# Patient Record
Sex: MALE | Race: WHITE
[De-identification: names, ages, dates, MRNs, and addresses within clinical notes are randomized per-mention and may not be internally consistent; named-entity substitution may affect disease eponyms.]

---

## 2022-01-23 ENCOUNTER — HOSPITAL ENCOUNTER (INPATIENT)
Dept: HOSPITAL 54 - ER | Age: 67
LOS: 3 days | Discharge: HOME | DRG: 194 | End: 2022-01-26
Attending: NURSE PRACTITIONER | Admitting: INTERNAL MEDICINE
Payer: COMMERCIAL

## 2022-01-23 VITALS — HEIGHT: 68 IN | BODY MASS INDEX: 31.22 KG/M2 | WEIGHT: 206 LBS

## 2022-01-23 DIAGNOSIS — E87.2: ICD-10-CM

## 2022-01-23 DIAGNOSIS — M48.04: ICD-10-CM

## 2022-01-23 DIAGNOSIS — I11.0: Primary | ICD-10-CM

## 2022-01-23 DIAGNOSIS — Z20.822: ICD-10-CM

## 2022-01-23 DIAGNOSIS — M25.78: ICD-10-CM

## 2022-01-23 DIAGNOSIS — I42.0: ICD-10-CM

## 2022-01-23 DIAGNOSIS — N62: ICD-10-CM

## 2022-01-23 DIAGNOSIS — I25.2: ICD-10-CM

## 2022-01-23 DIAGNOSIS — K86.1: ICD-10-CM

## 2022-01-23 DIAGNOSIS — K76.0: ICD-10-CM

## 2022-01-23 DIAGNOSIS — I21.A1: ICD-10-CM

## 2022-01-23 DIAGNOSIS — F41.9: ICD-10-CM

## 2022-01-23 DIAGNOSIS — I16.0: ICD-10-CM

## 2022-01-23 DIAGNOSIS — I50.31: ICD-10-CM

## 2022-01-23 DIAGNOSIS — F32.A: ICD-10-CM

## 2022-01-23 DIAGNOSIS — D69.6: ICD-10-CM

## 2022-01-23 DIAGNOSIS — Z79.82: ICD-10-CM

## 2022-01-23 DIAGNOSIS — R74.01: ICD-10-CM

## 2022-01-23 DIAGNOSIS — Z79.899: ICD-10-CM

## 2022-01-23 DIAGNOSIS — I25.10: ICD-10-CM

## 2022-01-23 LAB
ALBUMIN SERPL BCP-MCNC: 4 G/DL (ref 3.4–5)
ALP SERPL-CCNC: 51 U/L (ref 46–116)
ALT SERPL W P-5'-P-CCNC: 82 U/L (ref 12–78)
AST SERPL W P-5'-P-CCNC: 88 U/L (ref 15–37)
BASOPHILS # BLD AUTO: 0 K/UL (ref 0–0.2)
BASOPHILS NFR BLD AUTO: 0.3 % (ref 0–2)
BILIRUB DIRECT SERPL-MCNC: 0.2 MG/DL (ref 0–0.2)
BILIRUB SERPL-MCNC: 0.6 MG/DL (ref 0.2–1)
BILIRUB UR QL STRIP: NEGATIVE
BUN SERPL-MCNC: 12 MG/DL (ref 7–18)
CALCIUM SERPL-MCNC: 9.9 MG/DL (ref 8.5–10.1)
CHLORIDE SERPL-SCNC: 99 MMOL/L (ref 98–107)
CO2 SERPL-SCNC: 18 MMOL/L (ref 21–32)
COLOR UR: YELLOW
CREAT SERPL-MCNC: 1.2 MG/DL (ref 0.6–1.3)
DEPRECATED SQUAMOUS URNS QL MICRO: (no result) /HPF
EOSINOPHIL NFR BLD AUTO: 0.1 % (ref 0–6)
GLUCOSE SERPL-MCNC: 195 MG/DL (ref 74–106)
GLUCOSE UR STRIP-MCNC: NEGATIVE MG/DL
HCT VFR BLD AUTO: 52 % (ref 39–51)
HGB BLD-MCNC: 17.1 G/DL (ref 13.5–17.5)
LEUKOCYTE ESTERASE UR QL STRIP: NEGATIVE
LIPASE SERPL-CCNC: 246 U/L (ref 73–393)
LYMPHOCYTES NFR BLD AUTO: 0.7 K/UL (ref 0.8–4.8)
LYMPHOCYTES NFR BLD AUTO: 6.2 % (ref 20–44)
MAGNESIUM SERPL-MCNC: 1.9 MG/DL (ref 1.8–2.4)
MCHC RBC AUTO-ENTMCNC: 33 G/DL (ref 31–36)
MCV RBC AUTO: 86 FL (ref 80–96)
MONOCYTES NFR BLD AUTO: 0.4 K/UL (ref 0.1–1.3)
MONOCYTES NFR BLD AUTO: 3.4 % (ref 2–12)
NEUTROPHILS # BLD AUTO: 9.5 K/UL (ref 1.8–8.9)
NEUTROPHILS NFR BLD AUTO: 90 % (ref 43–81)
NITRITE UR QL STRIP: NEGATIVE
PH UR STRIP: 5.5 [PH] (ref 5–8)
PLATELET # BLD AUTO: 146 K/UL (ref 150–450)
POTASSIUM SERPL-SCNC: 3.5 MMOL/L (ref 3.5–5.1)
PROT SERPL-MCNC: 8.7 G/DL (ref 6.4–8.2)
PROT UR QL STRIP: >=300 MG/DL
RBC # BLD AUTO: 6.08 MIL/UL (ref 4.5–6)
RBC #/AREA URNS HPF: (no result) /HPF (ref 0–2)
SODIUM SERPL-SCNC: 140 MMOL/L (ref 136–145)
TSH SERPL DL<=0.005 MIU/L-ACNC: 1.82 UIU/ML (ref 0.36–3.74)
UROBILINOGEN UR STRIP-MCNC: 0.2 EU/DL
WBC #/AREA URNS HPF: (no result) /HPF (ref 0–3)
WBC NRBC COR # BLD AUTO: 10.6 K/UL (ref 4.3–11)

## 2022-01-23 PROCEDURE — G0378 HOSPITAL OBSERVATION PER HR: HCPCS

## 2022-01-23 PROCEDURE — C9803 HOPD COVID-19 SPEC COLLECT: HCPCS

## 2022-01-23 PROCEDURE — U0003 INFECTIOUS AGENT DETECTION BY NUCLEIC ACID (DNA OR RNA); SEVERE ACUTE RESPIRATORY SYNDROME CORONAVIRUS 2 (SARS-COV-2) (CORONAVIRUS DISEASE [COVID-19]), AMPLIFIED PROBE TECHNIQUE, MAKING USE OF HIGH THROUGHPUT TECHNOLOGIES AS DESCRIBED BY CMS-2020-01-R: HCPCS

## 2022-01-23 RX ADMIN — ENOXAPARIN SODIUM SCH MG: 40 INJECTION SUBCUTANEOUS at 23:30

## 2022-01-23 NOTE — NUR
TO ER BED 8, NIKKIA RA39 "Chills/NOT feeling well. Palpitation, Nausea/NO 
appetite". "I thought i'm hypoglycemic". Blood sugar 209, aaox3, breathing even 
and non labored, denies pain and sob, connected to monitor, awaiting md orders

## 2022-01-24 LAB
ALBUMIN SERPL BCP-MCNC: 3.3 G/DL (ref 3.4–5)
ALP SERPL-CCNC: 43 U/L (ref 46–116)
ALT SERPL W P-5'-P-CCNC: 59 U/L (ref 12–78)
AST SERPL W P-5'-P-CCNC: 70 U/L (ref 15–37)
BASOPHILS # BLD AUTO: 0 K/UL (ref 0–0.2)
BASOPHILS NFR BLD AUTO: 0.3 % (ref 0–2)
BILIRUB DIRECT SERPL-MCNC: 0.3 MG/DL (ref 0–0.2)
BILIRUB SERPL-MCNC: 1.2 MG/DL (ref 0.2–1)
BUN SERPL-MCNC: 9 MG/DL (ref 7–18)
CALCIUM SERPL-MCNC: 8.4 MG/DL (ref 8.5–10.1)
CHLORIDE SERPL-SCNC: 100 MMOL/L (ref 98–107)
CO2 SERPL-SCNC: 28 MMOL/L (ref 21–32)
CREAT SERPL-MCNC: 0.8 MG/DL (ref 0.6–1.3)
EOSINOPHIL NFR BLD AUTO: 0.8 % (ref 0–6)
GLUCOSE SERPL-MCNC: 102 MG/DL (ref 74–106)
HCT VFR BLD AUTO: 45 % (ref 39–51)
HGB BLD-MCNC: 15.2 G/DL (ref 13.5–17.5)
LYMPHOCYTES NFR BLD AUTO: 0.6 K/UL (ref 0.8–4.8)
LYMPHOCYTES NFR BLD AUTO: 12.1 % (ref 20–44)
MAGNESIUM SERPL-MCNC: 1.8 MG/DL (ref 1.8–2.4)
MCHC RBC AUTO-ENTMCNC: 34 G/DL (ref 31–36)
MCV RBC AUTO: 86 FL (ref 80–96)
MONOCYTES NFR BLD AUTO: 0.6 K/UL (ref 0.1–1.3)
MONOCYTES NFR BLD AUTO: 10.9 % (ref 2–12)
NEUTROPHILS # BLD AUTO: 3.9 K/UL (ref 1.8–8.9)
NEUTROPHILS NFR BLD AUTO: 75.9 % (ref 43–81)
PHOSPHATE SERPL-MCNC: 2.6 MG/DL (ref 2.5–4.9)
PLATELET # BLD AUTO: 99 K/UL (ref 150–450)
POTASSIUM SERPL-SCNC: 3.4 MMOL/L (ref 3.5–5.1)
PROT SERPL-MCNC: 7.2 G/DL (ref 6.4–8.2)
RBC # BLD AUTO: 5.27 MIL/UL (ref 4.5–6)
SODIUM SERPL-SCNC: 137 MMOL/L (ref 136–145)
TSH SERPL DL<=0.005 MIU/L-ACNC: 1.16 UIU/ML (ref 0.36–3.74)
WBC NRBC COR # BLD AUTO: 5.1 K/UL (ref 4.3–11)

## 2022-01-24 RX ADMIN — CEFEPIME HYDROCHLORIDE SCH MLS/HR: 1 INJECTION, POWDER, FOR SOLUTION INTRAMUSCULAR; INTRAVENOUS at 22:00

## 2022-01-24 RX ADMIN — SODIUM CHLORIDE PRN MLS/HR: 4.5 INJECTION, SOLUTION INTRAVENOUS at 03:55

## 2022-01-24 RX ADMIN — Medication SCH MG: at 08:09

## 2022-01-24 RX ADMIN — Medication SCH MG: at 08:10

## 2022-01-24 RX ADMIN — DEXTROSE MONOHYDRATE SCH MLS/HR: 50 INJECTION, SOLUTION INTRAVENOUS at 20:54

## 2022-01-24 RX ADMIN — CARVEDILOL SCH MG: 12.5 TABLET, FILM COATED ORAL at 16:34

## 2022-01-24 RX ADMIN — CARVEDILOL SCH MG: 12.5 TABLET, FILM COATED ORAL at 08:10

## 2022-01-24 RX ADMIN — PANTOPRAZOLE SODIUM SCH MG: 40 TABLET, DELAYED RELEASE ORAL at 08:09

## 2022-01-24 RX ADMIN — DEXTROSE MONOHYDRATE PRN MG: 50 INJECTION, SOLUTION INTRAVENOUS at 11:47

## 2022-01-24 RX ADMIN — CEFEPIME HYDROCHLORIDE SCH MLS/HR: 1 INJECTION, POWDER, FOR SOLUTION INTRAMUSCULAR; INTRAVENOUS at 10:11

## 2022-01-24 RX ADMIN — DEXTROSE MONOHYDRATE PRN MG: 50 INJECTION, SOLUTION INTRAVENOUS at 04:51

## 2022-01-24 RX ADMIN — DEXTROSE MONOHYDRATE SCH MLS/HR: 50 INJECTION, SOLUTION INTRAVENOUS at 08:47

## 2022-01-24 RX ADMIN — LISINOPRIL SCH MG: 5 TABLET ORAL at 08:11

## 2022-01-24 RX ADMIN — ENOXAPARIN SODIUM SCH MG: 40 INJECTION SUBCUTANEOUS at 21:17

## 2022-01-24 NOTE — NUR
PT TRANSPORTED TO UNIT ON [facility] WITH EMT AND RN AT BEDSIDE W/ ACLS PROTOCOL. 
NAD NOTED DURING TRANSPORT. PT AMBULATED FROM RNEY TO BED WITH MIN ASSIST.

## 2022-01-24 NOTE — NUR
2230 NOTED PATIENT'S IV DISLODGED.  HE SAID IT GOT PULLED OUT WHEN HE TRIED TO REACH FOR HIS 
CONTROL.  INSERTED NEW IV OF LEFT HAND, ATTEMTPED ONCE WITH GOOD BLOOD RETURN NOTED. IVF 
CONNECTED TO NEW IV ACCESS.

## 2022-01-24 NOTE — NUR
2115 DR LEBLANC WAS NOTIFIED OF PATIENT'S COMPLAIN OF ABDOMINAL PAIN 60/10 RATE WITH ORDER 
MADE.  ORDER NOTED AND CARRIED OUT.

## 2022-01-24 NOTE — NUR
RN NOTE

RECEIVED PT FROM ER VIA [facility] TO RM.110 ACCOMPANIED BY STAFF/RN. PT AWAKE, A/OX4. PT ABLE 
TO AMBULATE FROM GURNEY TO BED, PT FEELING "SHAKY", STANDBY ASSIST PROVIDED. RESPIRATIONS 
EVEN/UNLABORED, ON O2 @2LPM VIA NC. O2 SAT 97%. CONNECTED TO TELE MONITOR. IV SITE: [facility] 
#20G INTACT/PATENT/FLUSHES WELL. PT DENIES ANY PAIN AND DENIES NAUSEA AT THIS TIME. PT IN NO 
ACUTE DISTRESS. SAFETY MEASURES IN PLACE, BED IN LOWEST LOCKED POSITION, S/R UPX2, CALL 
LIGHT WITHIN REACH. WILL CONT TO MONITOR.

-------------------------------------------------------------------------------

Addendum: 01/24/22 at 0346 by AROLDO PATTON RN

-------------------------------------------------------------------------------

LOVENOX ALREADY GIVEN IN THE E.R., PER ER RN REPORT

## 2022-01-25 VITALS — DIASTOLIC BLOOD PRESSURE: 59 MMHG | SYSTOLIC BLOOD PRESSURE: 110 MMHG

## 2022-01-25 RX ADMIN — CARVEDILOL SCH MG: 12.5 TABLET, FILM COATED ORAL at 08:40

## 2022-01-25 RX ADMIN — CEFEPIME HYDROCHLORIDE SCH MLS/HR: 1 INJECTION, POWDER, FOR SOLUTION INTRAMUSCULAR; INTRAVENOUS at 09:54

## 2022-01-25 RX ADMIN — SODIUM CHLORIDE PRN MLS/HR: 4.5 INJECTION, SOLUTION INTRAVENOUS at 03:02

## 2022-01-25 RX ADMIN — CEFEPIME HYDROCHLORIDE SCH MLS/HR: 1 INJECTION, POWDER, FOR SOLUTION INTRAMUSCULAR; INTRAVENOUS at 22:18

## 2022-01-25 RX ADMIN — CARVEDILOL SCH MG: 12.5 TABLET, FILM COATED ORAL at 17:57

## 2022-01-25 RX ADMIN — ENOXAPARIN SODIUM SCH MG: 40 INJECTION SUBCUTANEOUS at 21:02

## 2022-01-25 RX ADMIN — Medication SCH MG: at 08:40

## 2022-01-25 RX ADMIN — DEXTROSE MONOHYDRATE SCH MLS/HR: 50 INJECTION, SOLUTION INTRAVENOUS at 21:02

## 2022-01-25 RX ADMIN — Medication PRN MG: at 12:10

## 2022-01-25 RX ADMIN — DEXTROSE MONOHYDRATE SCH MLS/HR: 50 INJECTION, SOLUTION INTRAVENOUS at 08:41

## 2022-01-25 RX ADMIN — Medication PRN MG: at 12:15

## 2022-01-25 RX ADMIN — LISINOPRIL SCH MG: 5 TABLET ORAL at 08:40

## 2022-01-25 RX ADMIN — PANTOPRAZOLE SODIUM SCH MG: 40 TABLET, DELAYED RELEASE ORAL at 08:39

## 2022-01-25 RX ADMIN — DEXTROSE MONOHYDRATE PRN MG: 50 INJECTION, SOLUTION INTRAVENOUS at 04:41

## 2022-01-25 NOTE — NUR
0630 NO FURTHER NAUSEA VOMITING NOTED.  NO COMPLAIN OF PAIN WHEN ASKED.  IVF INFUSING TO 
LEFT HAND. NO SIGNS OF INFILTRATION NOTED.  CALL LIGHT PLACED WITHIN REACH.  WILL ENDORSE TO 
AM NURSER FOR TRANSFER OF CARE.

## 2022-01-25 NOTE — NUR
1910 REPORT RE[facility]ED [facility]

## 2022-01-25 NOTE — NUR
RN NOTES



PATIENT BACK IN UNIT. PATIENT IN STABLE CONDITION. BREATHING EVEN AND UNLABORED. NO SOB OR 
ANY RESPIRATORY DISTRESS NOTED. ALL NEEDS ATTENDED. KEPT DRY AND CLEAN. ALL SAFETY 
PRECAUTIONS IN PLACE. BED LOCKED AND IN LOWEST POSITION. CALL LIGHT WITHIN REACH. WILL 
CONTINUE TO MONITOR.

## 2022-01-25 NOTE — NUR
RN CLOSING NOTES



PATIENT REMAINS IN STABLE CONDITION THROUGHOUT SHIFT. BREATHING EVEN AND UNLABORED IN ROOM 
AIR, TOLERATING WELL SAT 96%. IV ACCESS ON LEFT HAND #22 AND RIGHT AC #18, PATENT AND INTACT 
INFUSING 1/2 NS @ 75 ML/HR, NO SIGNS OF INFILTRATIONS. ALL DUE MEDS GIVEN AS ORDERED. KEPT 
PATIENT CLEAN, DRY AND COMFORTABLE. ALL NEEDS ATTENDED. ALL APPLICABLE ISOLATION PRECAUTIONS 
IN PLACE. ALL SAFETY MEASURES IN PLACE: HOB ELEVATED, BED LOCKED AND IN LOWEST POSITION WITH 
SIDERAILS UP. CALL LIGHT WITHIN REACH. WILL ENDORSE TO ONCOMING NURSE FOR CONTINUITY OF 
CARE.

## 2022-01-25 NOTE — NUR
0445 MEDICATED FOR COMPLAIN OF NAUSEA.  NO VOMITING NOTED.  PATIENT REMAINS AWAKE IN BED. NO 
SIGNS OF DISTRESS NOTED.  NO C/O PAIN WHEN ASKED. IVF INFUSING TO LEFT HAND, NO SIGNS OF 
INFILTRATION NOTED.  CONT. TO TOLERATE ROOM AIR.  ALL NEEDS ATTENDED. CALL LIGHT PLACED 
WITHIN REACH.

## 2022-01-25 NOTE — NUR
7155 ASSISTED TO BATHROOM. STILL SHAKY WHEN WALKING.  FALL PRECAUTIONS OBSERVED.  CONT. TO 
TOLERATE ROOM AIR.  NO C/O PAIN WHEN ASKED.  NO COMPLAIN OF SOB WHEN ASKED.  NEEDS ATTENDED. 
 CALL LIGHT WITHIN REACH.

## 2022-01-25 NOTE — NUR
0200 NO ACUTE CHANGES.  CONT. TO DENY CHEST PAIN.  NO DISTRESS NOTED. NEEDS ANTICIPATED.  
WILL CONT. MONITOR.

## 2022-01-25 NOTE — NUR
RN OPENING NOTES



RECEIVED PATIENT AWAKE, ALERT/ORIENTED X 4. ON ROOM AIR, TOLERATING WELL. BREATHING EVEN AND 
UNLABORED. NO SOB OR ANY SIGNS OF RESPIRATORY DISTRESS NOTED.  IV ACCESS ON LEFT HAND PATENT 
AND INTACT, INFUSING 1/2 NS AT 75 CC/HR. NO SIGNS OF INFILTRATIONS. ALL APPLICABLE 
ISOLATIONS PRECAUTIONS IN PLACE. ALL SAFETY MEASURES IN PLACE. BED LOCKED AND IN LOWEST 
POSITION WITH SIDERAILS UP. CALL LIGHT PLACED WITHIN REACH.  WILL CONTINUE TO MONITOR 
PATIENT ACCORDINGLY.

## 2022-01-25 NOTE — NUR
2000 RECEIVED PATIENT SITTING ON EDGE OF BED.  NO COMPLAIN OF PAIN OR ANY DISCOMFORT WHEN 
ASKED,  NO C/O SHORTNESS OF BREATH OF DIFFICULTY BREATHING WHEN ASKED.  ON ROOM AIR.  IVF 
INFUSING TO RIGHT AC. NO SIGNS OF INFILTRATION NOTED.  IVF STOPPED PER ORDER.  ASSISTED 
PATIENT TO BATHROOM.  STILL A LITTLE SHAKY WHEN WALKING.  FALL PRECAUTIONS OBSERVED.  
INDEPENDENT WITH TURNING.  CALL LIGHT PLACED WITH REACH AND REMINDED TO CALL FOR ASSISTANCE.

## 2022-01-25 NOTE — NUR
RN NOTES



PATIENT LEFT UNIT FOR PROCEDURE: CT ANGIOGRAM OF HEART WITH 3D IMAGE. PATIENT IN STABLE 
CONDITION AT THE TIME.

## 2022-01-26 VITALS — SYSTOLIC BLOOD PRESSURE: 124 MMHG | DIASTOLIC BLOOD PRESSURE: 80 MMHG

## 2022-01-26 VITALS — DIASTOLIC BLOOD PRESSURE: 80 MMHG | SYSTOLIC BLOOD PRESSURE: 120 MMHG

## 2022-01-26 VITALS — SYSTOLIC BLOOD PRESSURE: 125 MMHG | DIASTOLIC BLOOD PRESSURE: 89 MMHG

## 2022-01-26 VITALS — SYSTOLIC BLOOD PRESSURE: 140 MMHG | DIASTOLIC BLOOD PRESSURE: 85 MMHG

## 2022-01-26 VITALS — DIASTOLIC BLOOD PRESSURE: 80 MMHG | SYSTOLIC BLOOD PRESSURE: 124 MMHG

## 2022-01-26 VITALS — SYSTOLIC BLOOD PRESSURE: 112 MMHG | DIASTOLIC BLOOD PRESSURE: 61 MMHG

## 2022-01-26 LAB
BASOPHILS # BLD AUTO: 0 K/UL (ref 0–0.2)
BASOPHILS NFR BLD AUTO: 0.6 % (ref 0–2)
BUN SERPL-MCNC: 16 MG/DL (ref 7–18)
CALCIUM SERPL-MCNC: 9.1 MG/DL (ref 8.5–10.1)
CHLORIDE SERPL-SCNC: 102 MMOL/L (ref 98–107)
CO2 SERPL-SCNC: 28 MMOL/L (ref 21–32)
CREAT SERPL-MCNC: 1 MG/DL (ref 0.6–1.3)
EOSINOPHIL NFR BLD AUTO: 2.7 % (ref 0–6)
EOSINOPHIL NFR BLD MANUAL: 3 % (ref 0–4)
GLUCOSE SERPL-MCNC: 95 MG/DL (ref 74–106)
HCT VFR BLD AUTO: 47 % (ref 39–51)
HGB BLD-MCNC: 15.5 G/DL (ref 13.5–17.5)
LYMPHOCYTES NFR BLD AUTO: 1 K/UL (ref 0.8–4.8)
LYMPHOCYTES NFR BLD AUTO: 22.4 % (ref 20–44)
LYMPHOCYTES NFR BLD MANUAL: 26 % (ref 16–48)
MAGNESIUM SERPL-MCNC: 2.3 MG/DL (ref 1.8–2.4)
MCHC RBC AUTO-ENTMCNC: 33 G/DL (ref 31–36)
MCV RBC AUTO: 87 FL (ref 80–96)
MONOCYTES NFR BLD AUTO: 0.7 K/UL (ref 0.1–1.3)
MONOCYTES NFR BLD AUTO: 15.5 % (ref 2–12)
MONOCYTES NFR BLD MANUAL: 17 % (ref 0–11)
NEUTROPHILS # BLD AUTO: 2.6 K/UL (ref 1.8–8.9)
NEUTROPHILS NFR BLD AUTO: 58.8 % (ref 43–81)
NEUTS SEG NFR BLD MANUAL: 53 % (ref 42–76)
PHOSPHATE SERPL-MCNC: 4.8 MG/DL (ref 2.5–4.9)
PLATELET # BLD AUTO: 88 K/UL (ref 150–450)
POTASSIUM SERPL-SCNC: 4.2 MMOL/L (ref 3.5–5.1)
RBC # BLD AUTO: 5.41 MIL/UL (ref 4.5–6)
SODIUM SERPL-SCNC: 139 MMOL/L (ref 136–145)
WBC NRBC COR # BLD AUTO: 4.5 K/UL (ref 4.3–11)

## 2022-01-26 RX ADMIN — PANTOPRAZOLE SODIUM SCH MG: 40 TABLET, DELAYED RELEASE ORAL at 08:25

## 2022-01-26 RX ADMIN — Medication SCH MG: at 08:26

## 2022-01-26 RX ADMIN — DEXTROSE MONOHYDRATE SCH MLS/HR: 50 INJECTION, SOLUTION INTRAVENOUS at 08:26

## 2022-01-26 RX ADMIN — CARVEDILOL SCH MG: 12.5 TABLET, FILM COATED ORAL at 08:25

## 2022-01-26 RX ADMIN — CEFEPIME HYDROCHLORIDE SCH MLS/HR: 1 INJECTION, POWDER, FOR SOLUTION INTRAMUSCULAR; INTRAVENOUS at 11:10

## 2022-01-26 RX ADMIN — CARVEDILOL SCH MG: 12.5 TABLET, FILM COATED ORAL at 17:22

## 2022-01-26 RX ADMIN — LISINOPRIL SCH MG: 5 TABLET ORAL at 08:26

## 2022-01-26 NOTE — NUR
RN OPENING NOTES



RECEIVED PATIENT AWAKE, ALERT/ORIENTED X 4. ON ROOM AIR, TOLERATING WELL. BREATHING EVEN AND 
UNLABORED. NO SOB OR ANY SIGNS OF RESPIRATORY DISTRESS NOTED.  IV ACCESS ON RIGHT AC #18G 
INTACT, NOTED WITH LEAKING UPON FLUSHING. NO SIGNS OF INFILTRATIONS. ALL APPLICABLE 
ISOLATIONS PRECAUTIONS IN PLACE. ALL SAFETY MEASURES IN PLACE. BED LOCKED AND IN LOWEST 
POSITION WITH SIDERAILS UP. CALL LIGHT PLACED WITHIN REACH.  WILL CONTINUE TO MONITOR 
PATIENT ACCORDINGLY

## 2022-01-26 NOTE — NUR
RN CLOSING NOTES



PATIENT REMAINS ON STABLE CONDITION THROUGHOUT SHIFT. ON ROOM AIR, TOLERATING WELL. 
BREATHING EVEN AND UNLABORED. NO SOB OR ANY SIGNS OF RESPIRATORY DISTRESS NOTED.  IV ACCESS 
ON RIGHT FOREARM #18G INTACT AND PATENT. NO SIGNS OF INFILTRATIONS. ALL DUE MEDS GIVEN AS 
ORDERED. ALL NEEDS MET. KEPT PATIENT CLEAN AND COMFORTABLE. NURSE FROM [employer] AT 
BEDSIDE FOR [facility]IF[facility] INSTRUCTIONS. ALL APPLICABLE ISOLATIONS PRECAUTIONS IN PLACE. ALL 
SAFETY MEASURES IN PLACE. BED LOCKED AND IN LOWEST POSITION WITH SIDERAILS UP. CALL LIGHT 
PLACED WITHIN REACH.  ENDORSE TO ONCOMING NURSE FOR KEVIN.

## 2022-01-26 NOTE — NUR
RN OPENING NOTES



RECEIVED PATIENT AWAKE, ALERT/ORIENTED X 4. ON ROOM AIR, TOLERATING WELL. BREATHING EVEN AND 
UNLABORED. NO SOB OR ANY SIGNS OF RESPIRATORY DISTRESS NOTED. ALL SAFETY MEASURES IN PLACE. 
BED LOCKED AND IN LOWEST POSITION WITH SIDERAILS UP. CALL LIGHT PLACED WITHIN REACH.  WILL 
CONTINUE TO MONITOR PATIENT ACCORDINGLY

## 2022-01-26 NOTE — NUR
RN NOTES



TAXI ARRIVED TO PICKED UP THE PATIENT, PATIENT ACCOMPANIED TO LOBBY, TAXI NO. [id number] ([facility]- 
) LEFT HOSPITAL IN STABLE CONDITION, NO SOB, NOT IN DISTRESS.

## 2022-01-26 NOTE — NUR
RN NOTES



REINSERTED IV ACCESS ON LEFT FOREARM #18G, PATENT AND INTACT WITH GOOD BLOOD RETURN AND 
FLUSHES WELL.

## 2022-01-26 NOTE — NUR
RN NOTES



RESIDENT REFUSED TO WEAR THE LIFE VEST, EXPLAINED RISK AND BENEFITS OFFER 3X STILL REFUSED, 
DR. DU MADE AWARE AND ACKNOWLEDGED IT IS OK IF THE PATIENT REFUSED.

## 2022-01-26 NOTE — NUR
0300  ASLEEP BUT WAKES UP EASILY. NO ACUTE CHANGES IN CONDITION NOTED.  KEPT COMFORTABLE.  
NEEDS ANTICIPATED.  CALL LIGHT WITHIN REACH.

## 2024-06-04 NOTE — NUR
2000 RECEIVED AWAKE WATCHING TV.  NO COMPLAIN OF PAIN PARTICULARLY CHEST PAIN WHEN ASKED.  
NO COMPLAIN OF SHORTNESS OR BREATH OR DIFFICULTY BREATHING.  ASSISTED TO SINK AS REQUESTED.  
PATIENT NOTED SHAKY WHEN AMBULATING.  SAFETY PRECAUTION OBSERVED.  CALL LIGHT PLACED WITHIN 
REACH AND INSTRUCTED TO CALL FOR ASSISTANCE. [Smokers in Household] : there are no smokers in the home